# Patient Record
Sex: FEMALE | Race: WHITE
[De-identification: names, ages, dates, MRNs, and addresses within clinical notes are randomized per-mention and may not be internally consistent; named-entity substitution may affect disease eponyms.]

---

## 2019-10-02 ENCOUNTER — HOSPITAL ENCOUNTER (OUTPATIENT)
Dept: HOSPITAL 92 - RAD | Age: 54
Discharge: HOME | End: 2019-10-02
Attending: INTERNAL MEDICINE
Payer: COMMERCIAL

## 2019-10-02 DIAGNOSIS — R06.00: Primary | ICD-10-CM

## 2019-10-02 PROCEDURE — 71046 X-RAY EXAM CHEST 2 VIEWS: CPT

## 2019-10-02 NOTE — RAD
2 VIEWS CHEST:

 

Date:  10/02/19 

 

COMPARISON:  

08/29/19. 

 

HISTORY:  

Dyspnea. 

 

FINDINGS:

Two views of the chest show normal sized cardiomediastinal silhouette. There is no evidence of consol
idation, mass, or pleural effusion. The bones are unremarkable.  

 

IMPRESSION: 

No evidence of acute cardiopulmonary disease.   

 

 

 

 

POS: [facility]

## 2019-10-04 ENCOUNTER — HOSPITAL ENCOUNTER (OUTPATIENT)
Dept: HOSPITAL 92 - CT | Age: 54
Discharge: HOME | End: 2019-10-04
Attending: FAMILY MEDICINE
Payer: COMMERCIAL

## 2019-10-04 DIAGNOSIS — R06.02: Primary | ICD-10-CM

## 2019-10-04 DIAGNOSIS — K76.9: ICD-10-CM

## 2019-10-04 PROCEDURE — 71275 CT ANGIOGRAPHY CHEST: CPT

## 2019-10-04 NOTE — CT
CT ANGIOGRAM THORAX WITH IV CONTRAST AND 3-D RECONSTRUCTIONS



CLINICAL INDICATION:

Shortness of breath and chest pain on and off. 



COMPARISON:

None



FINDINGS:

Pulmonary arteries: No filling defects are seen in the pulmonary arteries to suggest a pulmonary embo
peter.



Aorta: The aorta is normal in caliber without evidence of an aortic dissection.



Lungs: There is atelectasis involving the right middle lobe. The lungs are otherwise clear. No pulmon
monica nodule or consolidation is seen. There is no evidence of a pleural effusion.



Mediastinum: There is no evidence of lymphadenopathy.



Thyroid gland: Visualized thyroid gland has a normal CT appearance.



Osseous structures: Mild degenerative changes are seen in the thoracic spine.



Chest wall: No abnormality visualized.



Upper abdomen: There are are a few subcentimeter too small to characterize hypodense lesions seen in 
the anterior segment of the right hepatic lobe. 



IMPRESSION:

1. No CT evidence of a pulmonary embolus. 

2. The lungs are clear. There is no pleural effusion or pneumothorax.

3. Too small to characterize subcentimeter hypodense lesions right hepatic lobe.



Reported By: Brennan Freeman 

Electronically Signed:  10/4/2019 2:43 PM